# Patient Record
Sex: MALE | Race: WHITE | Employment: UNEMPLOYED | URBAN - NONMETROPOLITAN AREA
[De-identification: names, ages, dates, MRNs, and addresses within clinical notes are randomized per-mention and may not be internally consistent; named-entity substitution may affect disease eponyms.]

---

## 2018-08-03 RX ORDER — HYDROCHLOROTHIAZIDE 25 MG/1
25 TABLET ORAL DAILY
COMMUNITY

## 2018-08-03 RX ORDER — TIOCONAZOLE 6.5 %
OINTMENT WITH PREFILLED APPLICATOR VAGINAL
COMMUNITY

## 2018-08-03 RX ORDER — AMLODIPINE BESYLATE 5 MG/1
5 TABLET ORAL DAILY
COMMUNITY

## 2018-08-03 RX ORDER — ASPIRIN 81 MG/1
81 TABLET, CHEWABLE ORAL DAILY
COMMUNITY

## 2018-08-03 RX ORDER — FLUTICASONE PROPIONATE 50 MCG
1 SPRAY, SUSPENSION (ML) NASAL DAILY
COMMUNITY

## 2018-08-03 RX ORDER — VALSARTAN 320 MG/1
320 TABLET ORAL DAILY
COMMUNITY

## 2018-08-03 RX ORDER — TAMSULOSIN HYDROCHLORIDE 0.4 MG/1
0.4 CAPSULE ORAL DAILY
COMMUNITY

## 2018-08-03 RX ORDER — VALACYCLOVIR HYDROCHLORIDE 500 MG/1
500 TABLET, FILM COATED ORAL 2 TIMES DAILY
COMMUNITY

## 2018-08-03 RX ORDER — AMLODIPINE AND VALSARTAN 5; 320 MG/1; MG/1
1 TABLET ORAL DAILY
COMMUNITY
End: 2018-12-11 | Stop reason: ALTCHOICE

## 2018-08-03 RX ORDER — METOPROLOL SUCCINATE 25 MG/1
25 TABLET, EXTENDED RELEASE ORAL DAILY
COMMUNITY

## 2018-08-03 RX ORDER — ROSUVASTATIN CALCIUM 20 MG/1
20 TABLET, COATED ORAL DAILY
COMMUNITY

## 2018-08-08 ENCOUNTER — OFFICE VISIT (OUTPATIENT)
Dept: PAIN MANAGEMENT | Age: 68
End: 2018-08-08
Payer: MEDICARE

## 2018-08-08 VITALS
DIASTOLIC BLOOD PRESSURE: 80 MMHG | RESPIRATION RATE: 14 BRPM | HEIGHT: 69 IN | BODY MASS INDEX: 34.45 KG/M2 | WEIGHT: 232.6 LBS | SYSTOLIC BLOOD PRESSURE: 128 MMHG

## 2018-08-08 DIAGNOSIS — M79.18 MYOFASCIAL PAIN: ICD-10-CM

## 2018-08-08 DIAGNOSIS — M47.816 LUMBAR FACET JOINT SYNDROME: Primary | ICD-10-CM

## 2018-08-08 PROCEDURE — 99204 OFFICE O/P NEW MOD 45 MIN: CPT | Performed by: NURSE PRACTITIONER

## 2018-08-08 ASSESSMENT — PATIENT HEALTH QUESTIONNAIRE - PHQ9
SUM OF ALL RESPONSES TO PHQ QUESTIONS 1-9: 1
SUM OF ALL RESPONSES TO PHQ QUESTIONS 1-9: 1
2. FEELING DOWN, DEPRESSED OR HOPELESS: 0
1. LITTLE INTEREST OR PLEASURE IN DOING THINGS: 1
SUM OF ALL RESPONSES TO PHQ9 QUESTIONS 1 & 2: 1

## 2018-08-08 ASSESSMENT — ENCOUNTER SYMPTOMS
SHORTNESS OF BREATH: 0
BACK PAIN: 1
CONSTIPATION: 1

## 2018-08-08 NOTE — PROGRESS NOTES
Cardiovascular: Normal rate. Pulmonary/Chest: Effort normal. No accessory muscle usage. No respiratory distress. Musculoskeletal:        Thoracic back: He exhibits tenderness (paraspinals). Lumbar back: He exhibits tenderness (paraspinals). Neurological: He is alert and oriented to person, place, and time. He is not disoriented. No cranial nerve deficit. GCS eye subscore is 4. GCS verbal subscore is 5. GCS motor subscore is 6.   - SLR, induced hamstring tightness   Skin: Skin is warm, dry and intact. He is not diaphoretic. No cyanosis. No pallor. Nails show no clubbing. Psychiatric: He has a normal mood and affect. His behavior is normal. He is not agitated and not aggressive. He does not express impulsivity or inappropriate judgment. He is communicative. Vitals reviewed. Assessment:      1. Lumbar facet joint syndrome ([address]    2. Myofascial pain          Plan:    Lumbar MRI reviewed, lumbar DDD and arthritis.  PT eval and treat, if ineffective consider TPI's, muscle relaxer, Rx NSAID   follow up 6 weeks     Ion Luna, APRN - CNP

## 2018-09-28 ENCOUNTER — OFFICE VISIT (OUTPATIENT)
Dept: PAIN MANAGEMENT | Age: 68
End: 2018-09-28
Payer: MEDICARE

## 2018-09-28 VITALS
HEIGHT: 70 IN | SYSTOLIC BLOOD PRESSURE: 120 MMHG | HEART RATE: 72 BPM | BODY MASS INDEX: 34.5 KG/M2 | WEIGHT: 241 LBS | DIASTOLIC BLOOD PRESSURE: 80 MMHG

## 2018-09-28 DIAGNOSIS — M51.26 PROTRUDED LUMBAR DISC: ICD-10-CM

## 2018-09-28 DIAGNOSIS — M54.16 LUMBAR RADICULOPATHY: Primary | ICD-10-CM

## 2018-09-28 PROCEDURE — 99213 OFFICE O/P EST LOW 20 MIN: CPT | Performed by: NURSE PRACTITIONER

## 2018-09-28 ASSESSMENT — ENCOUNTER SYMPTOMS
SHORTNESS OF BREATH: 0
CONSTIPATION: 1
BACK PAIN: 1

## 2018-09-28 NOTE — PROGRESS NOTES
Subjective:      Patient ID: Mohit Adrian is a 75 y.o. male. Chief Complaint   Patient presents with    Back Pain     6 week follow up, the patient was not able to complete physical therapy due to cardiac concerns     HPI Follow up after initial new patient consult. Started physical therapy, developed chest pain, underwent cardiac workup, heart is good.      Pain Assessment  Location of Pain: Back  Severity of Pain: 2 (patient states that the pain is 10/10 in the morning)  Quality of Pain:  (tightness)  Duration of Pain: Persistent  Frequency of Pain: Constant  Aggravating Factors: Bending, Stairs (lifting)  Limiting Behavior: Yes  Relieving Factors: Rest, Heat  Are there other pain locations you wish to document?: No    Allergies   Allergen Reactions    Atorvastatin      Muscle pain     Codeine Itching     Pruritis     Other Hives     wool       Outpatient Prescriptions Marked as Taking for the 9/28/18 encounter (Office Visit) with ANTHONY Byers - CNP   Medication Sig Dispense Refill    amLODIPine (NORVASC) 5 MG tablet Take 5 mg by mouth daily      aspirin 81 MG chewable tablet Take 81 mg by mouth daily      fluticasone (FLONASE) 50 MCG/ACT nasal spray 1 spray by Nasal route daily      Garlic (GNP GARLIC EXTRACT) 994 MG TABS Take by mouth      amLODIPine-valsartan (EXFORGE) 5-320 MG per tablet Take 1 tablet by mouth daily      hydrochlorothiazide (HYDRODIURIL) 25 MG tablet Take 25 mg by mouth daily      loratadine-pseudoephedrine (CLARITIN-D 12HR) 5-120 MG per extended release tablet Take 1 tablet by mouth 2 times daily      metFORMIN (GLUCOPHAGE) 500 MG tablet Take 500 mg by mouth 2 times daily (with meals)      metoprolol succinate (TOPROL XL) 25 MG extended release tablet Take 25 mg by mouth daily      Multiple Vitamin (STRESSTABS PO) Take by mouth      rosuvastatin (CRESTOR) 20 MG tablet Take 20 mg by mouth daily      tamsulosin (FLOMAX) 0.4 MG capsule Take 0.4 mg by mouth daily      valACYclovir (VALTREX) 500 MG tablet Take 500 mg by mouth 2 times daily      valsartan (DIOVAN) 320 MG tablet Take 320 mg by mouth daily         Past Medical History:   Diagnosis Date    Chronic left-sided low back pain with left-sided sciatica     Lumbar radiculopathy        Past Surgical History:   Procedure Laterality Date    [facility]  2016    [facility]    CYST REMOVAL      neck       History reviewed. No pertinent family history. Social History     Social History    Marital status:      Spouse name: N/A    Number of children: N/A    Years of education: N/A     Social History Main Topics    Smoking status: Former Smoker    Smokeless tobacco: Never Used      Comment: quit 30 yearsAGO    Alcohol use Yes      Comment: ON occasion    Drug use: No    Sexual activity: Not Asked     Other Topics Concern    None     Social History Narrative    None     Review of Systems   Constitutional: Positive for activity change and fatigue. Respiratory: Negative for shortness of breath. Cardiovascular: Negative for chest pain. Gastrointestinal: Positive for constipation (Miralax). Genitourinary: Positive for difficulty urinating (on Flomax). Musculoskeletal: Positive for arthralgias, back pain and myalgias. Neurological: Negative for weakness and numbness. Psychiatric/Behavioral: Positive for sleep disturbance. Objective:   Physical Exam   Constitutional: He is oriented to person, place, and time. Vital signs are normal. He appears well-developed and well-nourished. He is active and cooperative. Non-toxic appearance. He does not have a sickly appearance. No distress. HENT:   Head: Normocephalic and atraumatic. Cardiovascular: Normal rate. Pulmonary/Chest: Effort normal. No accessory muscle usage. No respiratory distress. Musculoskeletal:        Thoracic back: He exhibits tenderness (paraspinals). Lumbar back: He exhibits tenderness (paraspinals).    Neurological: He is

## 2018-11-09 ENCOUNTER — OFFICE VISIT (OUTPATIENT)
Dept: PAIN MANAGEMENT | Age: 68
End: 2018-11-09
Payer: MEDICARE

## 2018-11-09 VITALS
WEIGHT: 243 LBS | BODY MASS INDEX: 34.79 KG/M2 | HEART RATE: 64 BPM | HEIGHT: 70 IN | SYSTOLIC BLOOD PRESSURE: 140 MMHG | DIASTOLIC BLOOD PRESSURE: 84 MMHG

## 2018-11-09 DIAGNOSIS — M54.42 CHRONIC BILATERAL LOW BACK PAIN WITH LEFT-SIDED SCIATICA: ICD-10-CM

## 2018-11-09 DIAGNOSIS — M51.26 PROTRUDED LUMBAR DISC: ICD-10-CM

## 2018-11-09 DIAGNOSIS — M54.16 LUMBAR RADICULOPATHY: Primary | ICD-10-CM

## 2018-11-09 DIAGNOSIS — M47.817 LUMBOSACRAL SPONDYLOSIS WITHOUT MYELOPATHY: ICD-10-CM

## 2018-11-09 DIAGNOSIS — G89.29 CHRONIC BILATERAL LOW BACK PAIN WITH LEFT-SIDED SCIATICA: ICD-10-CM

## 2018-11-09 PROCEDURE — 99214 OFFICE O/P EST MOD 30 MIN: CPT | Performed by: NURSE PRACTITIONER

## 2018-11-09 RX ORDER — HYDROCODONE BITARTRATE AND ACETAMINOPHEN 5; 325 MG/1; MG/1
1 TABLET ORAL 2 TIMES DAILY PRN
Qty: 30 TABLET | Refills: 0 | Status: SHIPPED | OUTPATIENT
Start: 2018-11-09 | End: 2018-12-09

## 2018-11-09 RX ORDER — CELECOXIB 200 MG/1
200 CAPSULE ORAL DAILY
Qty: 30 CAPSULE | Refills: 3 | Status: SHIPPED | OUTPATIENT
Start: 2018-11-09 | End: 2018-12-11

## 2018-11-09 RX ORDER — IBUPROFEN 200 MG
200 TABLET ORAL EVERY 6 HOURS PRN
COMMUNITY
End: 2018-11-12 | Stop reason: ALTCHOICE

## 2018-11-09 ASSESSMENT — ENCOUNTER SYMPTOMS
SHORTNESS OF BREATH: 0
BACK PAIN: 1
CONSTIPATION: 1

## 2018-11-09 NOTE — PATIENT INSTRUCTIONS
Pain Management Plan:  1. Stop ibuprofen, start celebrex daily  2. Norco as needed for severe pain    Pain clinic phone numbers  Refills  - Call [phone]. Please leave your name, a working phone number, date of birth, the name, dose, quantity, and last refill date of the prescription, and the pharmacy. Medication or Procedure Questions - Call [phone]. This is the direct line to the [facility] Pain Management Nurses. Appointment or General Questions - Call  [phone]. This is the direct line the  [address] [place] [address] can also use [employer]. Is your MyChart Activated? How to dispose of unused pain medications safely:  · Flush all unused pain medications. This is the FDA's recommended way of disposing these medications. · All other medications can be disposed in the trash mixed in undesirable contents (used coffee grounds, used [employer], etc).       Left TFE scheduled for 11/21/18 at [facility]

## 2018-11-12 ENCOUNTER — TELEPHONE (OUTPATIENT)
Dept: PAIN MANAGEMENT | Age: 68
End: 2018-11-12

## 2018-11-15 RX ORDER — DICLOFENAC SODIUM 75 MG/1
75 TABLET, DELAYED RELEASE ORAL 2 TIMES DAILY
Qty: 60 TABLET | Refills: 3 | Status: SHIPPED | OUTPATIENT
Start: 2018-11-15 | End: 2018-12-11 | Stop reason: SDUPTHER

## 2018-12-11 ENCOUNTER — OFFICE VISIT (OUTPATIENT)
Dept: PAIN MANAGEMENT | Age: 68
End: 2018-12-11
Payer: MEDICARE

## 2018-12-11 VITALS
DIASTOLIC BLOOD PRESSURE: 80 MMHG | WEIGHT: 242.95 LBS | HEIGHT: 70 IN | BODY MASS INDEX: 34.78 KG/M2 | SYSTOLIC BLOOD PRESSURE: 138 MMHG | RESPIRATION RATE: 16 BRPM

## 2018-12-11 DIAGNOSIS — M47.817 LUMBOSACRAL SPONDYLOSIS WITHOUT MYELOPATHY: ICD-10-CM

## 2018-12-11 DIAGNOSIS — G89.29 CHRONIC BILATERAL LOW BACK PAIN WITH LEFT-SIDED SCIATICA: Primary | ICD-10-CM

## 2018-12-11 DIAGNOSIS — M51.26 PROTRUDED LUMBAR DISC: ICD-10-CM

## 2018-12-11 DIAGNOSIS — M54.42 CHRONIC BILATERAL LOW BACK PAIN WITH LEFT-SIDED SCIATICA: Primary | ICD-10-CM

## 2018-12-11 DIAGNOSIS — M54.16 LUMBAR RADICULOPATHY: ICD-10-CM

## 2018-12-11 PROCEDURE — 99213 OFFICE O/P EST LOW 20 MIN: CPT | Performed by: NURSE PRACTITIONER

## 2018-12-11 RX ORDER — LISINOPRIL 5 MG/1
5 TABLET ORAL
COMMUNITY
Start: 2018-10-19 | End: 2019-10-19

## 2018-12-11 RX ORDER — DICLOFENAC SODIUM 75 MG/1
75 TABLET, DELAYED RELEASE ORAL 2 TIMES DAILY
Qty: 180 TABLET | Refills: 3 | Status: SHIPPED | OUTPATIENT
Start: 2018-12-11 | End: 2019-06-20 | Stop reason: SDUPTHER

## 2018-12-11 RX ORDER — GUAIFENESIN/PHENYLPROPANOLAMIN
500 EXPECTORANT ORAL DAILY
COMMUNITY

## 2018-12-11 ASSESSMENT — ENCOUNTER SYMPTOMS
CONSTIPATION: 1
SHORTNESS OF BREATH: 0
BACK PAIN: 1

## 2019-06-20 ENCOUNTER — OFFICE VISIT (OUTPATIENT)
Dept: PAIN MANAGEMENT | Age: 69
End: 2019-06-20
Payer: MEDICARE

## 2019-06-20 VITALS
WEIGHT: 224 LBS | SYSTOLIC BLOOD PRESSURE: 120 MMHG | BODY MASS INDEX: 32.07 KG/M2 | HEIGHT: 70 IN | DIASTOLIC BLOOD PRESSURE: 76 MMHG | HEART RATE: 60 BPM

## 2019-06-20 DIAGNOSIS — M51.26 PROTRUDED LUMBAR DISC: ICD-10-CM

## 2019-06-20 DIAGNOSIS — G89.29 CHRONIC BILATERAL LOW BACK PAIN WITH LEFT-SIDED SCIATICA: Primary | ICD-10-CM

## 2019-06-20 DIAGNOSIS — M47.817 LUMBOSACRAL SPONDYLOSIS WITHOUT MYELOPATHY: ICD-10-CM

## 2019-06-20 DIAGNOSIS — M54.16 LUMBAR RADICULOPATHY: ICD-10-CM

## 2019-06-20 DIAGNOSIS — M54.42 CHRONIC BILATERAL LOW BACK PAIN WITH LEFT-SIDED SCIATICA: Primary | ICD-10-CM

## 2019-06-20 PROCEDURE — 99213 OFFICE O/P EST LOW 20 MIN: CPT | Performed by: NURSE PRACTITIONER

## 2019-06-20 RX ORDER — DICLOFENAC SODIUM 75 MG/1
75 TABLET, DELAYED RELEASE ORAL 2 TIMES DAILY
Qty: 180 TABLET | Refills: 3 | Status: SHIPPED | OUTPATIENT
Start: 2019-06-20

## 2019-06-20 ASSESSMENT — ENCOUNTER SYMPTOMS
SHORTNESS OF BREATH: 0
CONSTIPATION: 1
BACK PAIN: 1

## 2019-06-20 NOTE — PROGRESS NOTES
Chronic left-sided low back pain with left-sided sciatica     Lumbar radiculopathy        Past Surgical History:   Procedure Laterality Date    COLONOSCOPY  2016    [facility]    CYST REMOVAL      neck       History reviewed. No pertinent family history. Social History     Socioeconomic History    Marital status:      Spouse name: None    Number of children: None    Years of education: None    Highest education level: None   Occupational History    None   Social Needs    Financial resource strain: None    Food insecurity:     Worry: None     Inability: None    Transportation needs:     Medical: None     Non-medical: None   Tobacco Use    Smoking status: Former Smoker    Smokeless tobacco: Never Used    Tobacco comment: quit 30 yearsAGO   Substance and Sexual Activity    Alcohol use: Yes     Comment: ON occasion    Drug use: No    Sexual activity: None   Lifestyle    Physical activity:     Days per week: None     Minutes per session: None    Stress: None   Relationships    Social connections:     Talks on phone: None     Gets together: None     Attends [employer] service: None     Active member of club or organization: None     Attends meetings of clubs or organizations: None     Relationship status: None    Intimate partner violence:     Fear of current or ex partner: None     Emotionally abused: None     Physically abused: None     Forced sexual activity: None   Other Topics Concern    None   Social History Narrative    None     Review of Systems   Constitutional: Positive for activity change and fatigue. Respiratory: Negative for shortness of breath. Cardiovascular: Negative for chest pain. Gastrointestinal: Positive for constipation (Miralax). Genitourinary: Positive for difficulty urinating (on Flomax). Musculoskeletal: Positive for arthralgias, back pain and myalgias. Neurological: Negative for weakness and numbness.    Psychiatric/Behavioral: Positive for sleep disturbance. Objective:   Physical Exam   Constitutional: He is oriented to person, place, and time. Vital signs are normal. He appears well-developed and well-nourished. He is active and cooperative. Non-toxic appearance. He does not have a sickly appearance. No distress. HENT:   Head: Normocephalic and atraumatic. Cardiovascular: Normal rate. Pulmonary/Chest: Effort normal. No accessory muscle usage. No respiratory distress. Neurological: He is alert and oriented to person, place, and time. He is not disoriented. No cranial nerve deficit. GCS eye subscore is 4. GCS verbal subscore is 5. GCS motor subscore is 6.   - SLR, induced hamstring tightness   Skin: Skin is warm, dry and intact. He is not diaphoretic. No cyanosis. No pallor. Nails show no clubbing. Psychiatric: He has a normal mood and affect. His behavior is normal. He is not agitated and not aggressive. He does not express impulsivity or inappropriate judgment. He is communicative. Vitals reviewed. Assessment:      1. Chronic bilateral low back pain with left-sided sciatica    2. Lumbar radiculopathy    3. Lumbosacral spondylosis without myelopathy    4. Protruded lumbar disc            Plan:      Chronic pain diagnoses such as   1. Chronic bilateral low back pain with left-sided sciatica    2. Lumbar radiculopathy    3. Lumbosacral spondylosis without myelopathy    4. Protruded lumbar disc     controlled on current medication regime, wll continue current pain medications to improve quality of life and function.    Follow up prn, PCP can prescribe diclofenac        Archie Phan, ANTHONY - CNP